# Patient Record
Sex: MALE | Race: WHITE | NOT HISPANIC OR LATINO | Employment: FULL TIME | URBAN - METROPOLITAN AREA
[De-identification: names, ages, dates, MRNs, and addresses within clinical notes are randomized per-mention and may not be internally consistent; named-entity substitution may affect disease eponyms.]

---

## 2023-05-01 ENCOUNTER — OFFICE VISIT (OUTPATIENT)
Dept: PRIMARY CARE | Facility: CLINIC | Age: 33
End: 2023-05-01
Payer: COMMERCIAL

## 2023-05-01 ENCOUNTER — LAB (OUTPATIENT)
Dept: LAB | Facility: LAB | Age: 33
End: 2023-05-01
Payer: COMMERCIAL

## 2023-05-01 VITALS
HEIGHT: 75 IN | HEART RATE: 66 BPM | OXYGEN SATURATION: 97 % | BODY MASS INDEX: 23.5 KG/M2 | WEIGHT: 189 LBS | TEMPERATURE: 96.9 F | DIASTOLIC BLOOD PRESSURE: 78 MMHG | SYSTOLIC BLOOD PRESSURE: 120 MMHG

## 2023-05-01 DIAGNOSIS — Z00.00 HEALTHCARE MAINTENANCE: ICD-10-CM

## 2023-05-01 DIAGNOSIS — Z00.00 ENCOUNTER FOR WELLNESS EXAMINATION IN ADULT: ICD-10-CM

## 2023-05-01 DIAGNOSIS — G43.109 OCULAR MIGRAINE: ICD-10-CM

## 2023-05-01 DIAGNOSIS — J30.2 SEASONAL ALLERGIES: ICD-10-CM

## 2023-05-01 DIAGNOSIS — Z00.00 ENCOUNTER FOR WELLNESS EXAMINATION IN ADULT: Primary | ICD-10-CM

## 2023-05-01 DIAGNOSIS — E55.9 VITAMIN D DEFICIENCY: Primary | ICD-10-CM

## 2023-05-01 DIAGNOSIS — K58.8 OTHER IRRITABLE BOWEL SYNDROME: ICD-10-CM

## 2023-05-01 PROBLEM — E83.52 HYPERCALCEMIA: Status: ACTIVE | Noted: 2023-05-01

## 2023-05-01 PROCEDURE — 82306 VITAMIN D 25 HYDROXY: CPT

## 2023-05-01 PROCEDURE — 99395 PREV VISIT EST AGE 18-39: CPT | Performed by: STUDENT IN AN ORGANIZED HEALTH CARE EDUCATION/TRAINING PROGRAM

## 2023-05-01 PROCEDURE — 85025 COMPLETE CBC W/AUTO DIFF WBC: CPT

## 2023-05-01 PROCEDURE — 84443 ASSAY THYROID STIM HORMONE: CPT

## 2023-05-01 PROCEDURE — 1036F TOBACCO NON-USER: CPT | Performed by: STUDENT IN AN ORGANIZED HEALTH CARE EDUCATION/TRAINING PROGRAM

## 2023-05-01 PROCEDURE — 36415 COLL VENOUS BLD VENIPUNCTURE: CPT

## 2023-05-01 RX ORDER — DICYCLOMINE HYDROCHLORIDE 10 MG/1
10 CAPSULE ORAL 3 TIMES DAILY PRN
Qty: 90 CAPSULE | Refills: 0 | Status: SHIPPED | OUTPATIENT
Start: 2023-05-01 | End: 2024-03-21 | Stop reason: SDUPTHER

## 2023-05-01 RX ORDER — AZELASTINE 1 MG/ML
2 SPRAY, METERED NASAL 2 TIMES DAILY
COMMUNITY
Start: 2020-06-29

## 2023-05-01 RX ORDER — TRIAMCINOLONE ACETONIDE 55 UG/1
SPRAY, METERED NASAL
COMMUNITY
Start: 2022-05-03

## 2023-05-01 RX ORDER — DICYCLOMINE HYDROCHLORIDE 10 MG/1
CAPSULE ORAL
COMMUNITY
Start: 2022-05-03 | End: 2023-05-01 | Stop reason: SDUPTHER

## 2023-05-01 ASSESSMENT — ENCOUNTER SYMPTOMS
DIZZINESS: 0
COUGH: 0
DYSURIA: 0
FREQUENCY: 0
COLOR CHANGE: 0
ARTHRALGIAS: 0
RHINORRHEA: 0
DYSPHORIC MOOD: 0
LIGHT-HEADEDNESS: 0
FATIGUE: 0
CONSTIPATION: 0
ABDOMINAL PAIN: 0
CHILLS: 0
MYALGIAS: 0
NUMBNESS: 0
NERVOUS/ANXIOUS: 0
FEVER: 0
NAUSEA: 0
PALPITATIONS: 0
SORE THROAT: 0
VOMITING: 0
SHORTNESS OF BREATH: 0
DIARRHEA: 0

## 2023-05-01 NOTE — PROGRESS NOTES
"Subjective   Patient ID: Arcenio Rodriguez is a 33 y.o. male who presents for Annual Exam.    HPI     No acute concerns or complaints today.    Dental: Every 6 months  Vision: Has contacts. Due for an appointment.    Diet: Well balanced diet  Exercise: Running, cycling  Caffeine: Coffee in the morning.  Fluids: Enough  Supplements: phytoplankton    Tobacco: None. Never  Alcohol: 3-4 per week.  Recreational Drugs: None    Last Colonoscopy: On father's side, his grandmother and grandfather colon cancer. Grandfather diagnosed in his 50s. Father has been negative with screenings.  Patient had last colonoscopy in 2016. Was done for irritable bowel.    Influenza: Yearly.  Tetanus: 2015  COVID: Up to date.    Review of Systems   Constitutional:  Negative for chills, fatigue and fever.   HENT:  Negative for congestion, rhinorrhea and sore throat.    Eyes:  Negative for visual disturbance.   Respiratory:  Negative for cough and shortness of breath.    Cardiovascular:  Negative for chest pain and palpitations.   Gastrointestinal:  Negative for abdominal pain, constipation, diarrhea, nausea and vomiting.   Genitourinary:  Negative for dysuria and frequency.   Musculoskeletal:  Negative for arthralgias and myalgias.   Skin:  Negative for color change and rash.   Neurological:  Negative for dizziness, light-headedness and numbness.   Psychiatric/Behavioral:  Negative for dysphoric mood. The patient is not nervous/anxious.        Objective   /78   Pulse 66   Temp 36.1 °C (96.9 °F)   Ht 1.905 m (6' 3\")   Wt 85.7 kg (189 lb)   SpO2 97%   BMI 23.62 kg/m²   BSA Body surface area is 2.13 meters squared.      Physical Exam  Vitals and nursing note reviewed.   Constitutional:       General: He is not in acute distress.     Appearance: Normal appearance. He is normal weight. He is not ill-appearing or toxic-appearing.   HENT:      Head: Normocephalic and atraumatic.      Right Ear: Tympanic membrane, ear canal and external ear " normal.      Left Ear: Tympanic membrane, ear canal and external ear normal.      Nose: Nose normal.      Mouth/Throat:      Mouth: Mucous membranes are moist.      Pharynx: Oropharynx is clear.   Eyes:      Extraocular Movements: Extraocular movements intact.      Conjunctiva/sclera: Conjunctivae normal.      Pupils: Pupils are equal, round, and reactive to light.   Cardiovascular:      Rate and Rhythm: Normal rate and regular rhythm.      Pulses: Normal pulses.      Heart sounds: Normal heart sounds.   Pulmonary:      Effort: Pulmonary effort is normal.      Breath sounds: Normal breath sounds.   Abdominal:      General: Abdomen is flat. Bowel sounds are normal.      Palpations: Abdomen is soft.   Musculoskeletal:         General: Normal range of motion.      Cervical back: Normal range of motion and neck supple.   Skin:     General: Skin is warm and dry.   Neurological:      General: No focal deficit present.      Mental Status: He is alert and oriented to person, place, and time. Mental status is at baseline.      Cranial Nerves: No cranial nerve deficit.      Sensory: No sensory deficit.      Motor: No weakness.   Psychiatric:         Mood and Affect: Mood normal.         Behavior: Behavior normal.         Thought Content: Thought content normal.         Judgment: Judgment normal.       Lab on 05/01/2023   Component Date Value Ref Range Status    WBC 05/01/2023 5.8  4.4 - 11.3 x10E9/L Final    nRBC 05/01/2023 0.0  0.0 - 0.0 /100 WBC Final    RBC 05/01/2023 5.11  4.50 - 5.90 x10E12/L Final    Hemoglobin 05/01/2023 14.9  13.5 - 17.5 g/dL Final    Hematocrit 05/01/2023 45.8  41.0 - 52.0 % Final    MCV 05/01/2023 90  80 - 100 fL Final    MCHC 05/01/2023 32.5  32.0 - 36.0 g/dL Final    Platelets 05/01/2023 234  150 - 450 x10E9/L Final    RDW 05/01/2023 12.7  11.5 - 14.5 % Final    Neutrophils % 05/01/2023 54.3  40.0 - 80.0 % Final    Immature Granulocytes %, Automated 05/01/2023 0.0  0.0 - 0.9 % Final     Immature  Granulocyte Count (IG) includes promyelocytes,    myelocytes and metamyelocytes but does not include bands.   Percent differential counts (%) should be interpreted in the   context of the absolute cell counts (cells/L).    Lymphocytes % 05/01/2023 32.1  13.0 - 44.0 % Final    Monocytes % 05/01/2023 8.7  2.0 - 10.0 % Final    Eosinophils % 05/01/2023 3.9  0.0 - 6.0 % Final    Basophils % 05/01/2023 1.0  0.0 - 2.0 % Final    Neutrophils Absolute 05/01/2023 3.16  1.20 - 7.70 x10E9/L Final    Lymphocytes Absolute 05/01/2023 1.87  1.20 - 4.80 x10E9/L Final    Monocytes Absolute 05/01/2023 0.51  0.10 - 1.00 x10E9/L Final    Eosinophils Absolute 05/01/2023 0.23  0.00 - 0.70 x10E9/L Final    Basophils Absolute 05/01/2023 0.06  0.00 - 0.10 x10E9/L Final    TSH 05/01/2023 1.73  0.44 - 3.98 mIU/L Final     TSH testing is performed using different testing    methodology at [facility] than at other    [facility]. Direct result comparisons should    only be made within the same method.    Vitamin D, 25-Hydroxy 05/01/2023 26 (A)  ng/mL Final    .  DEFICIENCY:         < 20   NG/ML  INSUFFICIENCY:      20-29  NG/ML  SUFFICIENCY:         NG/ML    THIS ASSAY ACCURATELY QUANTIFIES THE SUM OF  VITAMIN D3, 25-HYDROXY AND VIT D2,25-HYDROXY.   [facility] Encounter on 05/03/2022   Component Date Value Ref Range Status    Glucose 05/03/2022 76  74 - 99 mg/dL Final    Sodium 05/03/2022 143  136 - 145 mmol/L Final    Potassium 05/03/2022 4.5  3.5 - 5.3 mmol/L Final    Chloride 05/03/2022 105  98 - 107 mmol/L Final    Bicarbonate 05/03/2022 30  21 - 32 mmol/L Final    Anion Gap 05/03/2022 13  10 - 20 mmol/L Final    Urea Nitrogen 05/03/2022 27 (H)  6 - 23 mg/dL Final    Creatinine 05/03/2022 1.04  0.50 - 1.30 mg/dL Final    GFR MALE 05/03/2022 >90  >90 mL/min/1.73m2 Final    Comment:  CALCULATIONS OF ESTIMATED GFR ARE PERFORMED   USING THE 2021 CKD-EPI STUDY REFIT EQUATION   WITHOUT THE RACE VARIABLE FOR THE  IDMS-TRACEABLE   CREATININE METHODS.    https://jasn.asnjournals.org/content/early/2021/09/22/ASN.9949035408      Calcium 05/03/2022 10.7 (H)  8.6 - 10.6 mg/dL Final    Albumin 05/03/2022 5.3 (H)  3.4 - 5.0 g/dL Final    Alkaline Phosphatase 05/03/2022 57  33 - 120 U/L Final    Total Protein 05/03/2022 7.4  6.4 - 8.2 g/dL Final    AST 05/03/2022 22  9 - 39 U/L Final    Total Bilirubin 05/03/2022 1.1  0.0 - 1.2 mg/dL Final    ALT (SGPT) 05/03/2022 18  10 - 52 U/L Final    Comment:  Patients treated with Sulfasalazine may generate    falsely decreased results for ALT.      TSH 05/03/2022 2.62  0.44 - 3.98 mIU/L Final    Comment:  TSH testing is performed using different testing    methodology at [facility] than at other    [facility]. Direct result comparisons should    only be made within the same method.      Cholesterol 05/03/2022 153  0 - 199 mg/dL Final    Comment: .      AGE      DESIRABLE   BORDERLINE HIGH   HIGH     0-19 Y     0 - 169       170 - 199     >/= 200    20-24 Y     0 - 189       190 - 224     >/= 225         >24 Y     0 - 199       200 - 239     >/= 240   **All ranges are based on fasting samples. Specific   therapeutic targets will vary based on patient-specific   cardiac risk.  .   Pediatric guidelines reference:Pediatrics 2011, 128(S5).   Adult guidelines reference: NCEP ATPIII Guidelines,     JULIA 2001, 258:2486-97  .   Venipuncture immediately after or during the    administration of Metamizole may lead to falsely   low results. Testing should be performed immediately   prior to Metamizole dosing.      HDL 05/03/2022 61.5  mg/dL Final    Comment: .      AGE      VERY LOW   LOW     NORMAL    HIGH       0-19 Y       < 35   < 40     40-45     ----    20-24 Y       ----   < 40       >45     ----      >24 Y       ----   < 40     40-60      >60  .      Cholesterol/HDL Ratio 05/03/2022 2.5   Final    Comment: REF VALUES  DESIRABLE  < 3.4  HIGH RISK  > 5.0      LDL 05/03/2022 78  0  - 99 mg/dL Final    Comment: .                           NEAR      BORD      AGE      DESIRABLE  OPTIMAL    HIGH     HIGH     VERY HIGH     0-19 Y     0 - 109     ---    110-129   >/= 130     ----    20-24 Y     0 - 119     ---    120-159   >/= 160     ----      >24 Y     0 -  99   100-129  130-159   160-189     >/=190  .      VLDL 05/03/2022 13  0 - 40 mg/dL Final    Triglycerides 05/03/2022 66  0 - 149 mg/dL Final    Comment: .      AGE      DESIRABLE   BORDERLINE HIGH   HIGH     VERY HIGH   0 D-90 D    19 - 174         ----         ----        ----  91 D- 9 Y     0 -  74        75 -  99     >/= 100      ----    10-19 Y     0 -  89        90 - 129     >/= 130      ----    20-24 Y     0 - 114       115 - 149     >/= 150      ----         >24 Y     0 - 149       150 - 199    200- 499    >/= 500  .   Venipuncture immediately after or during the    administration of Metamizole may lead to falsely   low results. Testing should be performed immediately   prior to Metamizole dosing.      WBC 05/03/2022 6.0  4.4 - 11.3 x10E9/L Final    nRBC 05/03/2022 0.0  0.0 - 0.0 /100 WBC Final    RBC 05/03/2022 4.94  4.50 - 5.90 x10E12/L Final    Hemoglobin 05/03/2022 14.9  13.5 - 17.5 g/dL Final    Hematocrit 05/03/2022 45.2  41.0 - 52.0 % Final    MCV 05/03/2022 91  80 - 100 fL Final    MCHC 05/03/2022 33.0  32.0 - 36.0 g/dL Final    Platelets 05/03/2022 209  150 - 450 x10E9/L Final    RDW 05/03/2022 13.0  11.5 - 14.5 % Final    Neutrophils % 05/03/2022 56.4  40.0 - 80.0 % Final    Immature Granulocytes %, Automated 05/03/2022 0.0  0.0 - 0.9 % Final    Comment:  Immature Granulocyte Count (IG) includes promyelocytes,    myelocytes and metamyelocytes but does not include bands.   Percent differential counts (%) should be interpreted in the   context of the absolute cell counts (cells/L).      Lymphocytes % 05/03/2022 30.5  13.0 - 44.0 % Final    Monocytes % 05/03/2022 8.7  2.0 - 10.0 % Final    Eosinophils % 05/03/2022 3.7  0.0 - 6.0  % Final    Basophils % 05/03/2022 0.7  0.0 - 2.0 % Final    Neutrophils Absolute 05/03/2022 3.37  1.20 - 7.70 x10E9/L Final    Lymphocytes Absolute 05/03/2022 1.82  1.20 - 4.80 x10E9/L Final    Monocytes Absolute 05/03/2022 0.52  0.10 - 1.00 x10E9/L Final    Eosinophils Absolute 05/03/2022 0.22  0.00 - 0.70 x10E9/L Final    Basophils Absolute 05/03/2022 0.04  0.00 - 0.10 x10E9/L Final    T-SPOT. TB Interpretation 05/03/2022 Negative  Normal Value: Negative Final    Comment: A negative test result does not exclude the possibility of exposure to   or infection with Mycobacterium tuberculosis (M. tuberculosis).    Patients with recent exposure to TB infected individuals exhibiting a   negative T-SPOT.TB result should be considered for retesting within 6   weeks or if other relevant clinical symptoms indicate.  Results from   T-SPOT.TB testing must be used in conjunction with each individual's   epidemiological history, current medical status, and results of other   diagnostic evaluations.  The T-SPOT.TB test is qualitative and results   are reported as positive, borderline or negative, given that the test   controls perform as expected. In line with the Centers for Disease   Control and Prevention's 2010 recommendation to report quantitative   measurements alongside the qualitative result, the laboratory provides   spot counts for informational purposes only.  The T-SPOT.TB test should   not be interpreted as a quantitative test.      Panel A Spot Count 05/03/2022 0   Final    Panel B Spot Count 05/03/2022 0   Final    NIL(NEG) Control Spot Count 05/03/2022 Passed   Final    POS Control Spot Count 05/03/2022 Passed   Final     Current Outpatient Medications on File Prior to Visit   Medication Sig Dispense Refill    azelastine (Astelin) 137 mcg (0.1 %) nasal spray Administer 2 sprays into each nostril 2 times a day.      triamcinolone (Nasacort) 55 mcg nasal inhaler Administer into affected nostril(s).       No current  facility-administered medications on file prior to visit.     No images are attached to the encounter.        Assessment/Plan   Problem List Items Addressed This Visit          Digestive    Other irritable bowel syndrome     Refilled patient's Bentyl.         Relevant Medications    dicyclomine (Bentyl) 10 mg capsule       Other    Seasonal allergies    Encounter for wellness examination in adult - Primary     Patient overall doing well without any acute concerns or complaints today.  Reviewed results of recent lab work.  Ordered a couple other screening tests including for vitamin D and rechecking thyroid function as well as blood counts.  No additional screening test needed at this time.  We will follow-up on results.  Patient to call back sooner for acute concerns or complaints.         Relevant Orders    CBC and Auto Differential (Completed)    TSH with reflex to Free T4 if abnormal (Completed)    Vitamin D, Total (Completed)    Ocular migraine     Other Visit Diagnoses       Healthcare maintenance        Relevant Orders    CBC and Auto Differential (Completed)    TSH with reflex to Free T4 if abnormal (Completed)    Vitamin D, Total (Completed)

## 2023-05-02 LAB
BASOPHILS (10*3/UL) IN BLOOD BY AUTOMATED COUNT: 0.06 X10E9/L (ref 0–0.1)
BASOPHILS/100 LEUKOCYTES IN BLOOD BY AUTOMATED COUNT: 1 % (ref 0–2)
CALCIDIOL (25 OH VITAMIN D3) (NG/ML) IN SER/PLAS: 26 NG/ML
EOSINOPHILS (10*3/UL) IN BLOOD BY AUTOMATED COUNT: 0.23 X10E9/L (ref 0–0.7)
EOSINOPHILS/100 LEUKOCYTES IN BLOOD BY AUTOMATED COUNT: 3.9 % (ref 0–6)
ERYTHROCYTE DISTRIBUTION WIDTH (RATIO) BY AUTOMATED COUNT: 12.7 % (ref 11.5–14.5)
ERYTHROCYTE MEAN CORPUSCULAR HEMOGLOBIN CONCENTRATION (G/DL) BY AUTOMATED: 32.5 G/DL (ref 32–36)
ERYTHROCYTE MEAN CORPUSCULAR VOLUME (FL) BY AUTOMATED COUNT: 90 FL (ref 80–100)
ERYTHROCYTES (10*6/UL) IN BLOOD BY AUTOMATED COUNT: 5.11 X10E12/L (ref 4.5–5.9)
HEMATOCRIT (%) IN BLOOD BY AUTOMATED COUNT: 45.8 % (ref 41–52)
HEMOGLOBIN (G/DL) IN BLOOD: 14.9 G/DL (ref 13.5–17.5)
IMMATURE GRANULOCYTES/100 LEUKOCYTES IN BLOOD BY AUTOMATED COUNT: 0 % (ref 0–0.9)
LEUKOCYTES (10*3/UL) IN BLOOD BY AUTOMATED COUNT: 5.8 X10E9/L (ref 4.4–11.3)
LYMPHOCYTES (10*3/UL) IN BLOOD BY AUTOMATED COUNT: 1.87 X10E9/L (ref 1.2–4.8)
LYMPHOCYTES/100 LEUKOCYTES IN BLOOD BY AUTOMATED COUNT: 32.1 % (ref 13–44)
MONOCYTES (10*3/UL) IN BLOOD BY AUTOMATED COUNT: 0.51 X10E9/L (ref 0.1–1)
MONOCYTES/100 LEUKOCYTES IN BLOOD BY AUTOMATED COUNT: 8.7 % (ref 2–10)
NEUTROPHILS (10*3/UL) IN BLOOD BY AUTOMATED COUNT: 3.16 X10E9/L (ref 1.2–7.7)
NEUTROPHILS/100 LEUKOCYTES IN BLOOD BY AUTOMATED COUNT: 54.3 % (ref 40–80)
NRBC (PER 100 WBCS) BY AUTOMATED COUNT: 0 /100 WBC (ref 0–0)
PLATELETS (10*3/UL) IN BLOOD AUTOMATED COUNT: 234 X10E9/L (ref 150–450)
THYROTROPIN (MIU/L) IN SER/PLAS BY DETECTION LIMIT <= 0.05 MIU/L: 1.73 MIU/L (ref 0.44–3.98)

## 2023-05-14 PROBLEM — G43.109 OCULAR MIGRAINE: Status: ACTIVE | Noted: 2023-05-14

## 2023-05-14 PROBLEM — Z00.00 ENCOUNTER FOR WELLNESS EXAMINATION IN ADULT: Status: ACTIVE | Noted: 2023-05-14

## 2023-05-14 PROBLEM — K58.8 OTHER IRRITABLE BOWEL SYNDROME: Status: ACTIVE | Noted: 2023-05-14

## 2023-05-15 NOTE — ASSESSMENT & PLAN NOTE
Patient overall doing well without any acute concerns or complaints today.  Reviewed results of recent lab work.  Ordered a couple other screening tests including for vitamin D and rechecking thyroid function as well as blood counts.  No additional screening test needed at this time.  We will follow-up on results.  Patient to call back sooner for acute concerns or complaints.

## 2023-05-19 ENCOUNTER — OFFICE VISIT (OUTPATIENT)
Dept: PRIMARY CARE | Facility: CLINIC | Age: 33
End: 2023-05-19
Payer: COMMERCIAL

## 2023-05-19 VITALS
WEIGHT: 191.38 LBS | DIASTOLIC BLOOD PRESSURE: 71 MMHG | BODY MASS INDEX: 23.92 KG/M2 | HEART RATE: 66 BPM | TEMPERATURE: 97.9 F | SYSTOLIC BLOOD PRESSURE: 116 MMHG | OXYGEN SATURATION: 97 %

## 2023-05-19 DIAGNOSIS — S05.01XA ABRASION OF RIGHT CORNEA, INITIAL ENCOUNTER: ICD-10-CM

## 2023-05-19 DIAGNOSIS — J02.8 ACUTE PHARYNGITIS DUE TO OTHER SPECIFIED ORGANISMS: Primary | ICD-10-CM

## 2023-05-19 DIAGNOSIS — J02.9 SORE THROAT: ICD-10-CM

## 2023-05-19 LAB — POC RAPID STREP: NEGATIVE

## 2023-05-19 PROCEDURE — 99213 OFFICE O/P EST LOW 20 MIN: CPT | Performed by: NURSE PRACTITIONER

## 2023-05-19 PROCEDURE — 87880 STREP A ASSAY W/OPTIC: CPT | Performed by: NURSE PRACTITIONER

## 2023-05-19 PROCEDURE — 1036F TOBACCO NON-USER: CPT | Performed by: NURSE PRACTITIONER

## 2023-05-19 RX ORDER — ACETAMINOPHEN 500 MG
TABLET ORAL DAILY
COMMUNITY

## 2023-05-19 RX ORDER — AMOXICILLIN AND CLAVULANATE POTASSIUM 875; 125 MG/1; MG/1
875 TABLET, FILM COATED ORAL 2 TIMES DAILY
Qty: 20 TABLET | Refills: 0 | Status: SHIPPED | OUTPATIENT
Start: 2023-05-19 | End: 2023-05-29

## 2023-05-19 RX ORDER — POLYMYXIN B SULFATE AND TRIMETHOPRIM 1; 10000 MG/ML; [USP'U]/ML
1 SOLUTION OPHTHALMIC EVERY 4 HOURS
Qty: 1 ML | Refills: 0 | Status: SHIPPED | OUTPATIENT
Start: 2023-05-19 | End: 2023-05-22

## 2023-05-19 ASSESSMENT — ENCOUNTER SYMPTOMS
DIARRHEA: 0
SINUS PRESSURE: 1
RHINORRHEA: 1
ABDOMINAL PAIN: 0
NAUSEA: 0
SINUS PAIN: 1
WHEEZING: 0
SORE THROAT: 1
FEVER: 0
PALPITATIONS: 0
CHILLS: 0
VOMITING: 0
FATIGUE: 0
SHORTNESS OF BREATH: 0
COUGH: 0

## 2023-05-19 NOTE — PROGRESS NOTES
Subjective   Chief Complaint: Sore Throat (2 weeks).    HPI   Arcenio Rodriguez is a 33 y.o. male who presents for Sore Throat (2 weeks).  Patient reports sore throat for 2 weeks. He does report a history of seasonal allergies and has been working outside a lot. He is concerned with the persistent sore throat. He reports it is waking him up at night and notices swelling in back of throat.     He does report nasal congestion has also been worse, sinus pressure, left ear pain/pressure.     Patient denies fever, chills, nausea, vomiting, diarrhea, chest pain, heart palpations, or shortness of breath.     Has tried flonase in the past with no relief.     Strep is negative.     Azelastine and nasacort has been using with no relief.      Review of Systems   Constitutional:  Negative for chills, fatigue and fever.   HENT:  Positive for congestion, ear pain, postnasal drip, rhinorrhea, sinus pressure, sinus pain, sneezing and sore throat. Negative for ear discharge and nosebleeds.    Respiratory:  Negative for cough, shortness of breath and wheezing.    Cardiovascular:  Negative for chest pain and palpitations.   Gastrointestinal:  Negative for abdominal pain, diarrhea, nausea and vomiting.       Objective   /71   Pulse 66   Temp 36.6 °C (97.9 °F) (Temporal)   Wt 86.8 kg (191 lb 6 oz)   SpO2 97%   BMI 23.92 kg/m²   BSA Body surface area is 2.14 meters squared.      Physical Exam  Constitutional:       Appearance: Normal appearance.   HENT:      Right Ear: Tympanic membrane normal.      Left Ear: Tympanic membrane normal.      Mouth/Throat:      Mouth: Mucous membranes are moist.      Pharynx: Posterior oropharyngeal erythema present. No oropharyngeal exudate.   Eyes:      Extraocular Movements: Extraocular movements intact.      Pupils: Pupils are equal, round, and reactive to light.   Cardiovascular:      Rate and Rhythm: Normal rate and regular rhythm.   Pulmonary:      Effort: Pulmonary effort is normal.       Breath sounds: Normal breath sounds.   Abdominal:      General: Abdomen is flat.      Palpations: Abdomen is soft.   Neurological:      Mental Status: He is alert.       Lab on 05/01/2023   Component Date Value Ref Range Status    WBC 05/01/2023 5.8  4.4 - 11.3 x10E9/L Final    nRBC 05/01/2023 0.0  0.0 - 0.0 /100 WBC Final    RBC 05/01/2023 5.11  4.50 - 5.90 x10E12/L Final    Hemoglobin 05/01/2023 14.9  13.5 - 17.5 g/dL Final    Hematocrit 05/01/2023 45.8  41.0 - 52.0 % Final    MCV 05/01/2023 90  80 - 100 fL Final    MCHC 05/01/2023 32.5  32.0 - 36.0 g/dL Final    Platelets 05/01/2023 234  150 - 450 x10E9/L Final    RDW 05/01/2023 12.7  11.5 - 14.5 % Final    Neutrophils % 05/01/2023 54.3  40.0 - 80.0 % Final    Immature Granulocytes %, Automated 05/01/2023 0.0  0.0 - 0.9 % Final     Immature Granulocyte Count (IG) includes promyelocytes,    myelocytes and metamyelocytes but does not include bands.   Percent differential counts (%) should be interpreted in the   context of the absolute cell counts (cells/L).    Lymphocytes % 05/01/2023 32.1  13.0 - 44.0 % Final    Monocytes % 05/01/2023 8.7  2.0 - 10.0 % Final    Eosinophils % 05/01/2023 3.9  0.0 - 6.0 % Final    Basophils % 05/01/2023 1.0  0.0 - 2.0 % Final    Neutrophils Absolute 05/01/2023 3.16  1.20 - 7.70 x10E9/L Final    Lymphocytes Absolute 05/01/2023 1.87  1.20 - 4.80 x10E9/L Final    Monocytes Absolute 05/01/2023 0.51  0.10 - 1.00 x10E9/L Final    Eosinophils Absolute 05/01/2023 0.23  0.00 - 0.70 x10E9/L Final    Basophils Absolute 05/01/2023 0.06  0.00 - 0.10 x10E9/L Final    TSH 05/01/2023 1.73  0.44 - 3.98 mIU/L Final     TSH testing is performed using different testing    methodology at [facility] than at other    [facility]. Direct result comparisons should    only be made within the same method.    Vitamin D, 25-Hydroxy 05/01/2023 26 (A)  ng/mL Final    .  DEFICIENCY:         < 20   NG/ML  INSUFFICIENCY:      20-29   NG/ML  SUFFICIENCY:         NG/ML    THIS ASSAY ACCURATELY QUANTIFIES THE SUM OF  VITAMIN D3, 25-HYDROXY AND VIT D2,25-HYDROXY.     Current Outpatient Medications on File Prior to Visit   Medication Sig Dispense Refill    azelastine (Astelin) 137 mcg (0.1 %) nasal spray Administer 2 sprays into each nostril 2 times a day.      cholecalciferol (Vitamin D3) 5,000 Units tablet Take by mouth once daily.      dicyclomine (Bentyl) 10 mg capsule Take 1 capsule (10 mg) by mouth 3 times a day as needed (abdominal cramping). 90 capsule 0    olopatadine HCl (PATADAY OPHT) Administer into affected eye(s).      triamcinolone (Nasacort) 55 mcg nasal inhaler Administer into affected nostril(s).       No current facility-administered medications on file prior to visit.     No images are attached to the encounter.            Assessment/Plan   Problem List Items Addressed This Visit          Infectious/Inflammatory    Acute pharyngitis due to other specified organisms - Primary     Antibiotic sent to pharmacy  Advised patient to push fluids  Advised patient to call if symptoms worsen or do not improve  IO strep neg          Relevant Medications    amoxicillin-pot clavulanate (Augmentin) 875-125 mg tablet       Other    Right corneal abrasion     Antibiotic sent to pharmacy          Relevant Medications    polymyxin B sulf-trimethoprim (Polytrim) ophthalmic solution     Other Visit Diagnoses       Sore throat        Relevant Orders    POCT Rapid Strep A manually resulted (Completed)

## 2023-05-19 NOTE — PATIENT INSTRUCTIONS
Start taking antibiotic as prescribed  Start using eye drops as prescribed  Call if your symptoms worsen or do not improve

## 2023-05-19 NOTE — ASSESSMENT & PLAN NOTE
Antibiotic sent to pharmacy  Advised patient to push fluids  Advised patient to call if symptoms worsen or do not improve  IO strep neg

## 2024-03-21 ENCOUNTER — OFFICE VISIT (OUTPATIENT)
Dept: PRIMARY CARE | Facility: CLINIC | Age: 34
End: 2024-03-21
Payer: COMMERCIAL

## 2024-03-21 VITALS
DIASTOLIC BLOOD PRESSURE: 70 MMHG | TEMPERATURE: 97.3 F | SYSTOLIC BLOOD PRESSURE: 122 MMHG | OXYGEN SATURATION: 97 % | WEIGHT: 188 LBS | HEART RATE: 78 BPM | HEIGHT: 75 IN | BODY MASS INDEX: 23.38 KG/M2

## 2024-03-21 DIAGNOSIS — K58.8 OTHER IRRITABLE BOWEL SYNDROME: ICD-10-CM

## 2024-03-21 DIAGNOSIS — K64.9 ACUTE HEMORRHOID: Primary | ICD-10-CM

## 2024-03-21 PROCEDURE — 1036F TOBACCO NON-USER: CPT | Performed by: FAMILY MEDICINE

## 2024-03-21 PROCEDURE — 99213 OFFICE O/P EST LOW 20 MIN: CPT | Performed by: FAMILY MEDICINE

## 2024-03-21 RX ORDER — BISMUTH SUBSALICYLATE 262 MG
1 TABLET,CHEWABLE ORAL DAILY
COMMUNITY

## 2024-03-21 RX ORDER — DICYCLOMINE HYDROCHLORIDE 10 MG/1
10 CAPSULE ORAL 3 TIMES DAILY PRN
Qty: 90 CAPSULE | Refills: 0 | Status: SHIPPED | OUTPATIENT
Start: 2024-03-21

## 2024-03-21 RX ORDER — HYDROCORTISONE 25 MG/G
CREAM TOPICAL 4 TIMES DAILY PRN
Qty: 30 G | Refills: 5 | Status: SHIPPED | OUTPATIENT
Start: 2024-03-21 | End: 2025-03-21

## 2024-03-21 ASSESSMENT — PATIENT HEALTH QUESTIONNAIRE - PHQ9
SUM OF ALL RESPONSES TO PHQ9 QUESTIONS 1 AND 2: 0
2. FEELING DOWN, DEPRESSED OR HOPELESS: NOT AT ALL
1. LITTLE INTEREST OR PLEASURE IN DOING THINGS: NOT AT ALL
10. IF YOU CHECKED OFF ANY PROBLEMS, HOW DIFFICULT HAVE THESE PROBLEMS MADE IT FOR YOU TO DO YOUR WORK, TAKE CARE OF THINGS AT HOME, OR GET ALONG WITH OTHER PEOPLE: NOT DIFFICULT AT ALL

## 2024-03-21 ASSESSMENT — ENCOUNTER SYMPTOMS
ROS GI COMMENTS: RECTAL PAIN
DEPRESSION: 0
OCCASIONAL FEELINGS OF UNSTEADINESS: 0
ABDOMINAL PAIN: 0
BACK PAIN: 0
RESPIRATORY NEGATIVE: 1
EYES NEGATIVE: 1
CARDIOVASCULAR NEGATIVE: 1
LOSS OF SENSATION IN FEET: 0
CONSTIPATION: 1

## 2024-03-21 NOTE — PATIENT INSTRUCTIONS
Would like you to use ibuprofen 600 mg 3 times a day.    Please use hemorrhoidal cream twice daily.    Please use sitz bath's.    If this is not improved or worsening symptoms, please call and let me know.

## 2024-03-21 NOTE — PROGRESS NOTES
"Subjective   Patient ID: Arcenio Rodriguez is a 34 y.o. male who presents for Rectal Pain (Bleeding and discomfort).    Taking prep H.  Patient presents with rectal pain.    Patient was traveling and ended up coming home he was a bit constipated had a formed bowel movement and then afterward had rectal irritation.  Had a bit of blood on the stool pain in the rectal area as well.  He has had no fever no chills or other change.  No troubles with abdominal pain or discomfort.    Recent constipaion.         patient presents with rectal pain bleeding and discomfort.    Review of Systems   Eyes: Negative.    Respiratory: Negative.     Cardiovascular: Negative.    Gastrointestinal:  Positive for constipation. Negative for abdominal pain.        Rectal pain   Musculoskeletal:  Negative for back pain.       Objective   /70   Pulse 78   Temp 36.3 °C (97.3 °F)   Ht 1.905 m (6' 3\")   Wt 85.3 kg (188 lb)   SpO2 97%   BMI 23.50 kg/m²   BSA Body surface area is 2.12 meters squared.      Physical Exam  Constitutional:       Appearance: Normal appearance.   HENT:      Mouth/Throat:      Mouth: Mucous membranes are dry.   Cardiovascular:      Rate and Rhythm: Normal rate and regular rhythm.      Pulses: Normal pulses.      Heart sounds: Normal heart sounds.   Pulmonary:      Effort: Pulmonary effort is normal.   Abdominal:      General: There is no distension.   Genitourinary:     Comments: External hemorrhoid.  Very painful for patient.  Unable to do a good rectal exam because of the discomfort.      Office Visit on 05/19/2023   Component Date Value Ref Range Status    POC Rapid Strep 05/19/2023 Negative  Negative Final   Lab on 05/01/2023   Component Date Value Ref Range Status    WBC 05/01/2023 5.8  4.4 - 11.3 x10E9/L Final    nRBC 05/01/2023 0.0  0.0 - 0.0 /100 WBC Final    RBC 05/01/2023 5.11  4.50 - 5.90 x10E12/L Final    Hemoglobin 05/01/2023 14.9  13.5 - 17.5 g/dL Final    Hematocrit 05/01/2023 45.8  41.0 - 52.0 % " Final    MCV 05/01/2023 90  80 - 100 fL Final    MCHC 05/01/2023 32.5  32.0 - 36.0 g/dL Final    Platelets 05/01/2023 234  150 - 450 x10E9/L Final    RDW 05/01/2023 12.7  11.5 - 14.5 % Final    Neutrophils % 05/01/2023 54.3  40.0 - 80.0 % Final    Immature Granulocytes %, Automated 05/01/2023 0.0  0.0 - 0.9 % Final     Immature Granulocyte Count (IG) includes promyelocytes,    myelocytes and metamyelocytes but does not include bands.   Percent differential counts (%) should be interpreted in the   context of the absolute cell counts (cells/L).    Lymphocytes % 05/01/2023 32.1  13.0 - 44.0 % Final    Monocytes % 05/01/2023 8.7  2.0 - 10.0 % Final    Eosinophils % 05/01/2023 3.9  0.0 - 6.0 % Final    Basophils % 05/01/2023 1.0  0.0 - 2.0 % Final    Neutrophils Absolute 05/01/2023 3.16  1.20 - 7.70 x10E9/L Final    Lymphocytes Absolute 05/01/2023 1.87  1.20 - 4.80 x10E9/L Final    Monocytes Absolute 05/01/2023 0.51  0.10 - 1.00 x10E9/L Final    Eosinophils Absolute 05/01/2023 0.23  0.00 - 0.70 x10E9/L Final    Basophils Absolute 05/01/2023 0.06  0.00 - 0.10 x10E9/L Final    TSH 05/01/2023 1.73  0.44 - 3.98 mIU/L Final     TSH testing is performed using different testing    methodology at [facility] than at other    [facility] hospitals. Direct result comparisons should    only be made within the same method.    Vitamin D, 25-Hydroxy 05/01/2023 26 (A)  ng/mL Final    .  DEFICIENCY:         < 20   NG/ML  INSUFFICIENCY:      20-29  NG/ML  SUFFICIENCY:         NG/ML    THIS ASSAY ACCURATELY QUANTIFIES THE SUM OF  VITAMIN D3, 25-HYDROXY AND VIT D2,25-HYDROXY.     Current Outpatient Medications on File Prior to Visit   Medication Sig Dispense Refill    azelastine (Astelin) 137 mcg (0.1 %) nasal spray Administer 2 sprays into each nostril 2 times a day.      cholecalciferol (Vitamin D3) 5,000 Units tablet Take by mouth once daily.      dicyclomine (Bentyl) 10 mg capsule Take 1 capsule (10 mg) by mouth 3 times  a day as needed (abdominal cramping). 90 capsule 0    multivitamin tablet Take 1 tablet by mouth once daily.      olopatadine HCl (PATADAY OPHT) Administer into affected eye(s).      triamcinolone (Nasacort) 55 mcg nasal inhaler Administer into affected nostril(s).       No current facility-administered medications on file prior to visit.     No images are attached to the encounter.            Assessment/Plan   Problem List Items Addressed This Visit             ICD-10-CM    Other irritable bowel syndrome K58.8    Relevant Medications    dicyclomine (Bentyl) 10 mg capsule    Acute hemorrhoid - Primary K64.9    Relevant Medications    hydrocortisone (Anusol-HC) 2.5 % rectal cream

## 2024-03-29 ENCOUNTER — APPOINTMENT (OUTPATIENT)
Dept: PRIMARY CARE | Facility: CLINIC | Age: 34
End: 2024-03-29
Payer: COMMERCIAL

## 2024-07-09 ENCOUNTER — APPOINTMENT (OUTPATIENT)
Dept: PRIMARY CARE | Facility: CLINIC | Age: 34
End: 2024-07-09
Payer: COMMERCIAL

## 2024-07-09 ENCOUNTER — LAB (OUTPATIENT)
Dept: LAB | Facility: LAB | Age: 34
End: 2024-07-09
Payer: COMMERCIAL

## 2024-07-09 VITALS
OXYGEN SATURATION: 98 % | WEIGHT: 188 LBS | SYSTOLIC BLOOD PRESSURE: 132 MMHG | BODY MASS INDEX: 23.5 KG/M2 | DIASTOLIC BLOOD PRESSURE: 76 MMHG | HEART RATE: 57 BPM | TEMPERATURE: 97.5 F

## 2024-07-09 DIAGNOSIS — E55.9 VITAMIN D DEFICIENCY: ICD-10-CM

## 2024-07-09 DIAGNOSIS — G43.109 OCULAR MIGRAINE: ICD-10-CM

## 2024-07-09 DIAGNOSIS — K58.8 OTHER IRRITABLE BOWEL SYNDROME: ICD-10-CM

## 2024-07-09 DIAGNOSIS — R25.1 EPISODE OF SHAKING: ICD-10-CM

## 2024-07-09 DIAGNOSIS — Z00.00 HEALTHCARE MAINTENANCE: ICD-10-CM

## 2024-07-09 DIAGNOSIS — R25.1 EPISODE OF SHAKING: Primary | ICD-10-CM

## 2024-07-09 LAB
25(OH)D3 SERPL-MCNC: 46 NG/ML (ref 30–100)
ALBUMIN SERPL BCP-MCNC: 5 G/DL (ref 3.4–5)
ALP SERPL-CCNC: 59 U/L (ref 33–120)
ALT SERPL W P-5'-P-CCNC: 34 U/L (ref 10–52)
ANION GAP SERPL CALC-SCNC: 15 MMOL/L (ref 10–20)
AST SERPL W P-5'-P-CCNC: 24 U/L (ref 9–39)
BASOPHILS # BLD AUTO: 0.06 X10*3/UL (ref 0–0.1)
BASOPHILS NFR BLD AUTO: 1 %
BILIRUB SERPL-MCNC: 1 MG/DL (ref 0–1.2)
BUN SERPL-MCNC: 25 MG/DL (ref 6–23)
CALCIUM SERPL-MCNC: 10 MG/DL (ref 8.6–10.6)
CHLORIDE SERPL-SCNC: 103 MMOL/L (ref 98–107)
CHOLEST SERPL-MCNC: 149 MG/DL (ref 0–199)
CHOLESTEROL/HDL RATIO: 2.4
CO2 SERPL-SCNC: 26 MMOL/L (ref 21–32)
CREAT SERPL-MCNC: 1.11 MG/DL (ref 0.5–1.3)
EGFRCR SERPLBLD CKD-EPI 2021: 89 ML/MIN/1.73M*2
EOSINOPHIL # BLD AUTO: 0.18 X10*3/UL (ref 0–0.7)
EOSINOPHIL NFR BLD AUTO: 3.1 %
ERYTHROCYTE [DISTWIDTH] IN BLOOD BY AUTOMATED COUNT: 12.3 % (ref 11.5–14.5)
EST. AVERAGE GLUCOSE BLD GHB EST-MCNC: 97 MG/DL
FOLATE SERPL-MCNC: >24 NG/ML
GLUCOSE SERPL-MCNC: 81 MG/DL (ref 74–99)
HBA1C MFR BLD: 5 %
HCT VFR BLD AUTO: 41.5 % (ref 41–52)
HDLC SERPL-MCNC: 63.2 MG/DL
HGB BLD-MCNC: 14.1 G/DL (ref 13.5–17.5)
IMM GRANULOCYTES # BLD AUTO: 0.01 X10*3/UL (ref 0–0.7)
IMM GRANULOCYTES NFR BLD AUTO: 0.2 % (ref 0–0.9)
LDLC SERPL CALC-MCNC: 76 MG/DL
LYMPHOCYTES # BLD AUTO: 2.15 X10*3/UL (ref 1.2–4.8)
LYMPHOCYTES NFR BLD AUTO: 37.2 %
MAGNESIUM SERPL-MCNC: 2.21 MG/DL (ref 1.6–2.4)
MCH RBC QN AUTO: 29.1 PG (ref 26–34)
MCHC RBC AUTO-ENTMCNC: 34 G/DL (ref 32–36)
MCV RBC AUTO: 86 FL (ref 80–100)
MONOCYTES # BLD AUTO: 0.48 X10*3/UL (ref 0.1–1)
MONOCYTES NFR BLD AUTO: 8.3 %
NEUTROPHILS # BLD AUTO: 2.9 X10*3/UL (ref 1.2–7.7)
NEUTROPHILS NFR BLD AUTO: 50.2 %
NON HDL CHOLESTEROL: 86 MG/DL (ref 0–149)
NRBC BLD-RTO: 0 /100 WBCS (ref 0–0)
PLATELET # BLD AUTO: 245 X10*3/UL (ref 150–450)
POTASSIUM SERPL-SCNC: 4.3 MMOL/L (ref 3.5–5.3)
PROT SERPL-MCNC: 7 G/DL (ref 6.4–8.2)
RBC # BLD AUTO: 4.85 X10*6/UL (ref 4.5–5.9)
SODIUM SERPL-SCNC: 140 MMOL/L (ref 136–145)
T4 FREE SERPL-MCNC: 1.24 NG/DL (ref 0.78–1.48)
TRIGL SERPL-MCNC: 51 MG/DL (ref 0–149)
TSH SERPL-ACNC: 1.92 MIU/L (ref 0.44–3.98)
VIT B12 SERPL-MCNC: 766 PG/ML (ref 211–911)
VLDL: 10 MG/DL (ref 0–40)
WBC # BLD AUTO: 5.8 X10*3/UL (ref 4.4–11.3)

## 2024-07-09 PROCEDURE — 82607 VITAMIN B-12: CPT

## 2024-07-09 PROCEDURE — 80061 LIPID PANEL: CPT

## 2024-07-09 PROCEDURE — 99213 OFFICE O/P EST LOW 20 MIN: CPT | Performed by: STUDENT IN AN ORGANIZED HEALTH CARE EDUCATION/TRAINING PROGRAM

## 2024-07-09 PROCEDURE — 84439 ASSAY OF FREE THYROXINE: CPT

## 2024-07-09 PROCEDURE — 36415 COLL VENOUS BLD VENIPUNCTURE: CPT

## 2024-07-09 PROCEDURE — 82746 ASSAY OF FOLIC ACID SERUM: CPT

## 2024-07-09 PROCEDURE — 84443 ASSAY THYROID STIM HORMONE: CPT

## 2024-07-09 PROCEDURE — 80053 COMPREHEN METABOLIC PANEL: CPT

## 2024-07-09 PROCEDURE — 85025 COMPLETE CBC W/AUTO DIFF WBC: CPT

## 2024-07-09 PROCEDURE — 82306 VITAMIN D 25 HYDROXY: CPT

## 2024-07-09 PROCEDURE — 83735 ASSAY OF MAGNESIUM: CPT

## 2024-07-09 PROCEDURE — 83036 HEMOGLOBIN GLYCOSYLATED A1C: CPT

## 2024-07-09 ASSESSMENT — ENCOUNTER SYMPTOMS
ABDOMINAL PAIN: 0
FATIGUE: 0
LIGHT-HEADEDNESS: 0
MYALGIAS: 0
FEVER: 0
SEIZURES: 0
SHORTNESS OF BREATH: 0
DIZZINESS: 0
COUGH: 0
ARTHRALGIAS: 0
NAUSEA: 0
CONSTIPATION: 0
WEAKNESS: 0
HEADACHES: 1
TREMORS: 1
NUMBNESS: 0
VOMITING: 0
CHILLS: 0
DIARRHEA: 0

## 2024-07-09 NOTE — PROGRESS NOTES
Subjective   Patient ID: Arcenio Rodriguez is a 34 y.o. male who presents for Follow-up (Full body shakes last week).    HPI     He has a history of ocular migraines. He had a workup with an MRI in 2022.  He has an upcoming appointment with his eye doctor.  He has been having recurrent chancre sores.    [place]  [place] from the day. He had pizza and some beer but only about 1-2 drinks.  As he was going to the bathroom and started shaking. This was in his hands as well.  He thinks he was decently hydrated.  He had a lot of blankets and it calmed down after about an hour.  He got a lot of sun since [place] but he wasn't really terribly burned.  Never had this in the past. No symptoms like this since that time.  No issues on the flight home.  Some headaches but no other symptoms.    Review of Systems   Constitutional:  Negative for chills, fatigue and fever.   HENT:  Negative for congestion and postnasal drip.    Respiratory:  Negative for cough and shortness of breath.    Cardiovascular:  Negative for chest pain.   Gastrointestinal:  Negative for abdominal pain, constipation, diarrhea, nausea and vomiting.   Musculoskeletal:  Negative for arthralgias and myalgias.   Neurological:  Positive for tremors and headaches. Negative for dizziness, seizures, weakness, light-headedness and numbness.       Objective   /76   Pulse 57   Temp 36.4 °C (97.5 °F)   Wt 85.3 kg (188 lb)   SpO2 98%   BMI 23.50 kg/m²     Physical Exam  Vitals and nursing note reviewed.   Constitutional:       General: He is not in acute distress.     Appearance: Normal appearance. He is normal weight. He is not ill-appearing or toxic-appearing.   HENT:      Head: Normocephalic and atraumatic.      Right Ear: Tympanic membrane, ear canal and external ear normal.      Left Ear: Tympanic membrane, ear canal and external ear normal.   Cardiovascular:      Rate and Rhythm:  Normal rate and regular rhythm.      Heart sounds: Normal heart sounds.   Pulmonary:      Effort: Pulmonary effort is normal.      Breath sounds: Normal breath sounds.   Neurological:      General: No focal deficit present.      Mental Status: He is alert and oriented to person, place, and time. Mental status is at baseline.      Cranial Nerves: No cranial nerve deficit.      Motor: No weakness.      Gait: Gait normal.         Assessment/Plan   Problem List Items Addressed This Visit             ICD-10-CM    Ocular migraine G43.109    Other irritable bowel syndrome K58.8     Other Visit Diagnoses         Codes    Episode of shaking    -  Primary R25.1    Relevant Orders    Thyroid Stimulating Hormone    Thyroxine, Free    Vitamin B12    Folate    Hemoglobin A1C    Magnesium    Healthcare maintenance     Z00.00    Relevant Orders    Lipid Panel    Comprehensive Metabolic Panel    CBC and Auto Differential    Hemoglobin A1C    Vitamin D deficiency     E55.9    Relevant Orders    Vitamin D 25-Hydroxy,Total (for eval of Vitamin D levels)          History and physical examination as above.  Lab work orders placed for the patient and he can go downstairs and have these done since he is fasting.  We will contact him back with the results especially if they are more abnormal.  Will plan on doing a more in-depth follow-up appointment for his wellness examination.  He has not had any other symptoms since the initial episodes.  Instructed to call if he starts experiencing anything else.  Will add on vitamin B testing as well as well as making sure blood sugars are overall well-controlled.  Patient will follow-up sooner for other acute concerns or complaints.

## 2024-07-09 NOTE — PATIENT INSTRUCTIONS
Lab work orders placed for you.  Since you are fasting today, you can go downstairs and get these done.  Will have the results before the end of the week.  I will try to have my nurse give you call back with those results.    Please call me if you end up having any other symptoms or any other issues in that timeframe.    Please get set up for a wellness exam with me before you leave the office today.  I think I am scheduling into August.    Thank you

## 2024-07-11 DIAGNOSIS — R03.0 ELEVATED BLOOD PRESSURE READING: Primary | ICD-10-CM

## 2024-07-18 ENCOUNTER — LAB (OUTPATIENT)
Dept: LAB | Facility: LAB | Age: 34
End: 2024-07-18
Payer: COMMERCIAL

## 2024-07-18 DIAGNOSIS — R03.0 ELEVATED BLOOD PRESSURE READING: ICD-10-CM

## 2024-07-18 PROCEDURE — 82043 UR ALBUMIN QUANTITATIVE: CPT

## 2024-07-18 PROCEDURE — 81003 URINALYSIS AUTO W/O SCOPE: CPT

## 2024-07-18 PROCEDURE — 82570 ASSAY OF URINE CREATININE: CPT

## 2024-07-19 LAB
APPEARANCE UR: CLEAR
BILIRUB UR STRIP.AUTO-MCNC: NEGATIVE MG/DL
COLOR UR: NORMAL
CREAT UR-MCNC: 38 MG/DL (ref 20–370)
GLUCOSE UR STRIP.AUTO-MCNC: NORMAL MG/DL
KETONES UR STRIP.AUTO-MCNC: NEGATIVE MG/DL
LEUKOCYTE ESTERASE UR QL STRIP.AUTO: NEGATIVE
MICROALBUMIN UR-MCNC: <7 MG/L
MICROALBUMIN/CREAT UR: NORMAL MG/G{CREAT}
NITRITE UR QL STRIP.AUTO: NEGATIVE
PH UR STRIP.AUTO: 7 [PH]
PROT UR STRIP.AUTO-MCNC: NEGATIVE MG/DL
RBC # UR STRIP.AUTO: NEGATIVE /UL
SP GR UR STRIP.AUTO: 1.01
UROBILINOGEN UR STRIP.AUTO-MCNC: NORMAL MG/DL

## 2024-09-10 ENCOUNTER — APPOINTMENT (OUTPATIENT)
Dept: PRIMARY CARE | Facility: CLINIC | Age: 34
End: 2024-09-10
Payer: COMMERCIAL

## 2024-09-17 ENCOUNTER — APPOINTMENT (OUTPATIENT)
Dept: PRIMARY CARE | Facility: CLINIC | Age: 34
End: 2024-09-17
Payer: COMMERCIAL

## 2024-10-24 ENCOUNTER — APPOINTMENT (OUTPATIENT)
Dept: PRIMARY CARE | Facility: CLINIC | Age: 34
End: 2024-10-24
Payer: COMMERCIAL

## 2024-10-24 VITALS
DIASTOLIC BLOOD PRESSURE: 82 MMHG | HEART RATE: 57 BPM | HEIGHT: 75 IN | BODY MASS INDEX: 23.57 KG/M2 | WEIGHT: 189.6 LBS | SYSTOLIC BLOOD PRESSURE: 121 MMHG | OXYGEN SATURATION: 98 %

## 2024-10-24 DIAGNOSIS — Z00.00 ENCOUNTER FOR ADULT WELLNESS VISIT: Primary | ICD-10-CM

## 2024-10-24 DIAGNOSIS — K58.8 OTHER IRRITABLE BOWEL SYNDROME: ICD-10-CM

## 2024-10-24 PROBLEM — S05.01XA RIGHT CORNEAL ABRASION: Status: RESOLVED | Noted: 2023-05-19 | Resolved: 2024-10-24

## 2024-10-24 PROBLEM — K64.9 ACUTE HEMORRHOID: Status: RESOLVED | Noted: 2024-03-21 | Resolved: 2024-10-24

## 2024-10-24 PROBLEM — J02.8 ACUTE PHARYNGITIS DUE TO OTHER SPECIFIED ORGANISMS: Status: RESOLVED | Noted: 2023-05-19 | Resolved: 2024-10-24

## 2024-10-24 RX ORDER — AMOXICILLIN AND CLAVULANATE POTASSIUM 875; 125 MG/1; MG/1
1 TABLET, FILM COATED ORAL
COMMUNITY
Start: 2024-10-21

## 2024-10-24 ASSESSMENT — ENCOUNTER SYMPTOMS
CHILLS: 0
NUMBNESS: 0
DYSPHORIC MOOD: 0
ABDOMINAL PAIN: 0
FATIGUE: 0
SORE THROAT: 0
LIGHT-HEADEDNESS: 0
NERVOUS/ANXIOUS: 0
DYSURIA: 0
COLOR CHANGE: 0
ARTHRALGIAS: 0
FEVER: 0
SHORTNESS OF BREATH: 0
DIZZINESS: 0
NAUSEA: 0
RHINORRHEA: 0
FREQUENCY: 0
PALPITATIONS: 0
COUGH: 0
CONSTIPATION: 0
MYALGIAS: 0
DIARRHEA: 0
VOMITING: 0

## 2024-10-24 ASSESSMENT — PATIENT HEALTH QUESTIONNAIRE - PHQ9
2. FEELING DOWN, DEPRESSED OR HOPELESS: NOT AT ALL
SUM OF ALL RESPONSES TO PHQ9 QUESTIONS 1 AND 2: 0
1. LITTLE INTEREST OR PLEASURE IN DOING THINGS: NOT AT ALL

## 2024-10-24 NOTE — PATIENT INSTRUCTIONS
Debrox ear drops.    Thank you for coming in for your wellness examination.    We did perform an EKG in the office today which overall did not have any abnormal findings and did look pretty consistent with your previous EKG from 2018.    I went ahead and placed a referral for gastroenterology.  Will fax this over to their office.  Please call in the next week or so if you do not hear from their office about getting set up for an appointment.    Please keep up the good work with overall healthy diet and exercise.  Excellent work with reducing alcohol intake.    Once you are feeling better and off the antibiotic, I would recommend getting an annual flu vaccine as well as the updated COVID immunization.    We can continue with regular follow-up.    Please come in sooner with any other acute concerns or complaints.

## 2024-10-24 NOTE — PROGRESS NOTES
"Subjective   Patient ID: Arcenio Rodriguez is a 34 y.o. male who presents for Annual Exam (Patient is here for annual exam and hypertension follow up. ).    HPI     No acute concerns or complaints today.    Dental: Every 6 months  Vision: Has contacts. Yearly eye exams.    Diet: Well balanced diet  Exercise: Running, cycling  Caffeine: Coffee in the morning.  Fluids: Well hydrated.  Supplements: Multivitamin, apple cider vinegar    Tobacco: None. Never  Alcohol: Maybe a glass of wine a week.  Recreational Drugs: None    Last Colonoscopy: On father's side, his grandmother and grandfather colon cancer. Grandfather diagnosed in his 50s. Father has been negative with screenings.  Patient had last colonoscopy in 2015 with Dr. Joyner in [place]. Was done for irritable bowel.  Low Dose Lung CT Screening: Not indicated.  AAA Screening: Not indicated.    Influenza: Recommended annually.  Tetanus: 2015  COVID: Recommended updated vaccine.    Review of Systems   Constitutional:  Negative for chills, fatigue and fever.   HENT:  Negative for congestion, rhinorrhea and sore throat.    Eyes:  Negative for visual disturbance.   Respiratory:  Negative for cough and shortness of breath.    Cardiovascular:  Negative for chest pain and palpitations.   Gastrointestinal:  Negative for abdominal pain, constipation, diarrhea, nausea and vomiting.   Genitourinary:  Negative for dysuria and frequency.   Musculoskeletal:  Negative for arthralgias and myalgias.   Skin:  Negative for color change and rash.   Neurological:  Negative for dizziness, light-headedness and numbness.   Psychiatric/Behavioral:  Negative for dysphoric mood. The patient is not nervous/anxious.        Objective   /82   Pulse 57   Ht 1.905 m (6' 3\")   Wt 86 kg (189 lb 9.6 oz)   SpO2 98%   BMI 23.70 kg/m²   BSA Body surface area is 2.13 meters squared.      Physical Exam  Vitals and nursing note reviewed.   Constitutional:       General: He is not in acute distress.   "   Appearance: Normal appearance. He is normal weight. He is not ill-appearing or toxic-appearing.   HENT:      Head: Normocephalic and atraumatic.      Right Ear: Tympanic membrane, ear canal and external ear normal.      Left Ear: Tympanic membrane, ear canal and external ear normal.      Nose: Nose normal.      Mouth/Throat:      Mouth: Mucous membranes are moist.      Pharynx: Oropharynx is clear.   Eyes:      Extraocular Movements: Extraocular movements intact.      Conjunctiva/sclera: Conjunctivae normal.      Pupils: Pupils are equal, round, and reactive to light.   Cardiovascular:      Rate and Rhythm: Normal rate and regular rhythm.      Pulses: Normal pulses.      Heart sounds: Normal heart sounds.   Pulmonary:      Effort: Pulmonary effort is normal.      Breath sounds: Normal breath sounds.   Abdominal:      General: Abdomen is flat. Bowel sounds are normal.      Palpations: Abdomen is soft.   Musculoskeletal:         General: Normal range of motion.      Cervical back: Normal range of motion and neck supple.   Skin:     General: Skin is warm and dry.   Neurological:      General: No focal deficit present.      Mental Status: He is alert and oriented to person, place, and time. Mental status is at baseline.      Cranial Nerves: No cranial nerve deficit.      Sensory: No sensory deficit.      Motor: No weakness.   Psychiatric:         Mood and Affect: Mood normal.         Behavior: Behavior normal.         Thought Content: Thought content normal.         Judgment: Judgment normal.       Lab on 07/18/2024   Component Date Value Ref Range Status    Color, Urine 07/18/2024 Light-Yellow  Light-Yellow, Yellow, Dark-Yellow Final    Appearance, Urine 07/18/2024 Clear  Clear Final    Specific Gravity, Urine 07/18/2024 1.008  1.005 - 1.035 Final    pH, Urine 07/18/2024 7.0  5.0, 5.5, 6.0, 6.5, 7.0, 7.5, 8.0 Final    Protein, Urine 07/18/2024 NEGATIVE  NEGATIVE, 10 (TRACE), 20 (TRACE) mg/dL Final    Glucose, Urine  07/18/2024 Normal  Normal mg/dL Final    Blood, Urine 07/18/2024 NEGATIVE  NEGATIVE Final    Ketones, Urine 07/18/2024 NEGATIVE  NEGATIVE mg/dL Final    Bilirubin, Urine 07/18/2024 NEGATIVE  NEGATIVE Final    Urobilinogen, Urine 07/18/2024 Normal  Normal mg/dL Final    Nitrite, Urine 07/18/2024 NEGATIVE  NEGATIVE Final    Leukocyte Esterase, Urine 07/18/2024 NEGATIVE  NEGATIVE Final    Albumin, Urine Random 07/18/2024 <7.0  Not established mg/L Final    Creatinine, Urine Random 07/18/2024 38.0  20.0 - 370.0 mg/dL Final    Albumin/Creatinine Ratio 07/18/2024    Final    One or more analytes used in this calculation is outside of the analytical measurement range. Calculation cannot be performed.   Lab on 07/09/2024   Component Date Value Ref Range Status    Cholesterol 07/09/2024 149  0 - 199 mg/dL Final          Age      Desirable   Borderline High   High     0-19 Y     0 - 169       170 - 199     >/= 200    20-24 Y     0 - 189       190 - 224     >/= 225         >24 Y     0 - 199       200 - 239     >/= 240   **All ranges are based on fasting samples. Specific   therapeutic targets will vary based on patient-specific   cardiac risk.    Pediatric guidelines reference:Pediatrics 2011, 128(S5).Adult guidelines reference: NCEP ATPIII Guidelines,JULIA 2001, 258:2486-97    Venipuncture immediately after or during the administration of Metamizole may lead to falsely low results. Testing should be performed immediately prior to Metamizole dosing.    HDL-Cholesterol 07/09/2024 63.2  mg/dL Final      Age       Very Low   Low     Normal    High    0-19 Y    < 35      < 40     40-45     ----  20-24 Y    ----     < 40      >45      ----        >24 Y      ----     < 40     40-60      >60      Cholesterol/HDL Ratio 07/09/2024 2.4   Final      Ref Values  Desirable  < 3.4  High Risk  > 5.0    LDL Calculated 07/09/2024 76  <=99 mg/dL Final                                Near   Borderline      AGE      Desirable  Optimal    High      High     Very High     0-19 Y     0 - 109     ---    110-129   >/= 130     ----    20-24 Y     0 - 119     ---    120-159   >/= 160     ----      >24 Y     0 -  99   100-129  130-159   160-189     >/=190      VLDL 07/09/2024 10  0 - 40 mg/dL Final    Triglycerides 07/09/2024 51  0 - 149 mg/dL Final       Age         Desirable   Borderline High   High     Very High   0 D-90 D    19 - 174         ----         ----        ----  91 D- 9 Y     0 -  74        75 -  99     >/= 100      ----    10-19 Y     0 -  89        90 - 129     >/= 130      ----    20-24 Y     0 - 114       115 - 149     >/= 150      ----         >24 Y     0 - 149       150 - 199    200- 499    >/= 500    Venipuncture immediately after or during the administration of Metamizole may lead to falsely low results. Testing should be performed immediately prior to Metamizole dosing.    Non HDL Cholesterol 07/09/2024 86  0 - 149 mg/dL Final          Age       Desirable   Borderline High   High     Very High     0-19 Y     0 - 119       120 - 144     >/= 145    >/= 160    20-24 Y     0 - 149       150 - 189     >/= 190      ----         >24 Y    30 mg/dL above LDL Cholesterol goal      Glucose 07/09/2024 81  74 - 99 mg/dL Final    Sodium 07/09/2024 140  136 - 145 mmol/L Final    Potassium 07/09/2024 4.3  3.5 - 5.3 mmol/L Final    Chloride 07/09/2024 103  98 - 107 mmol/L Final    Bicarbonate 07/09/2024 26  21 - 32 mmol/L Final    Anion Gap 07/09/2024 15  10 - 20 mmol/L Final    Urea Nitrogen 07/09/2024 25 (H)  6 - 23 mg/dL Final    Creatinine 07/09/2024 1.11  0.50 - 1.30 mg/dL Final    eGFR 07/09/2024 89  >60 mL/min/1.73m*2 Final    Calculations of estimated GFR are performed using the 2021 CKD-EPI Study Refit equation without the race variable for the IDMS-Traceable creatinine methods.  https://jasn.asnjournals.org/content/early/2021/09/22/ASN.4532654741    Calcium 07/09/2024 10.0  8.6 - 10.6 mg/dL Final    Albumin 07/09/2024 5.0  3.4 - 5.0 g/dL Final     Alkaline Phosphatase 07/09/2024 59  33 - 120 U/L Final    Total Protein 07/09/2024 7.0  6.4 - 8.2 g/dL Final    AST 07/09/2024 24  9 - 39 U/L Final    Bilirubin, Total 07/09/2024 1.0  0.0 - 1.2 mg/dL Final    ALT 07/09/2024 34  10 - 52 U/L Final    Patients treated with Sulfasalazine may generate falsely decreased results for ALT.    WBC 07/09/2024 5.8  4.4 - 11.3 x10*3/uL Final    nRBC 07/09/2024 0.0  0.0 - 0.0 /100 WBCs Final    RBC 07/09/2024 4.85  4.50 - 5.90 x10*6/uL Final    Hemoglobin 07/09/2024 14.1  13.5 - 17.5 g/dL Final    Hematocrit 07/09/2024 41.5  41.0 - 52.0 % Final    MCV 07/09/2024 86  80 - 100 fL Final    MCH 07/09/2024 29.1  26.0 - 34.0 pg Final    MCHC 07/09/2024 34.0  32.0 - 36.0 g/dL Final    RDW 07/09/2024 12.3  11.5 - 14.5 % Final    Platelets 07/09/2024 245  150 - 450 x10*3/uL Final    Neutrophils % 07/09/2024 50.2  40.0 - 80.0 % Final    Immature Granulocytes %, Automated 07/09/2024 0.2  0.0 - 0.9 % Final    Immature Granulocyte Count (IG) includes promyelocytes, myelocytes and metamyelocytes but does not include bands. Percent differential counts (%) should be interpreted in the context of the absolute cell counts (cells/UL).    Lymphocytes % 07/09/2024 37.2  13.0 - 44.0 % Final    Monocytes % 07/09/2024 8.3  2.0 - 10.0 % Final    Eosinophils % 07/09/2024 3.1  0.0 - 6.0 % Final    Basophils % 07/09/2024 1.0  0.0 - 2.0 % Final    Neutrophils Absolute 07/09/2024 2.90  1.20 - 7.70 x10*3/uL Final    Percent differential counts (%) should be interpreted in the context of the absolute cell counts (cells/uL).    Immature Granulocytes Absolute, Au* 07/09/2024 0.01  0.00 - 0.70 x10*3/uL Final    Lymphocytes Absolute 07/09/2024 2.15  1.20 - 4.80 x10*3/uL Final    Monocytes Absolute 07/09/2024 0.48  0.10 - 1.00 x10*3/uL Final    Eosinophils Absolute 07/09/2024 0.18  0.00 - 0.70 x10*3/uL Final    Basophils Absolute 07/09/2024 0.06  0.00 - 0.10 x10*3/uL Final    Thyroid Stimulating Hormone 07/09/2024  1.92  0.44 - 3.98 mIU/L Final    Thyroxine, Free 07/09/2024 1.24  0.78 - 1.48 ng/dL Final    Vitamin D, 25-Hydroxy, Total 07/09/2024 46  30 - 100 ng/mL Final    Vitamin B12 07/09/2024 766  211 - 911 pg/mL Final    Folate, Serum 07/09/2024 >24.0  >5.0 ng/mL Final    Hemoglobin A1C 07/09/2024 5.0  see below % Final    Estimated Average Glucose 07/09/2024 97  Not Established mg/dL Final    Magnesium 07/09/2024 2.21  1.60 - 2.40 mg/dL Final     Current Outpatient Medications on File Prior to Visit   Medication Sig Dispense Refill    amoxicillin-pot clavulanate (Augmentin) 875-125 mg tablet Take 1 tablet by mouth every 12 hours.      azelastine (Astelin) 137 mcg (0.1 %) nasal spray Administer 2 sprays into each nostril 2 times a day.      cholecalciferol (Vitamin D3) 5,000 Units tablet Take by mouth once daily.      dicyclomine (Bentyl) 10 mg capsule Take 1 capsule (10 mg) by mouth 3 times a day as needed (abdominal cramping). 90 capsule 0    hydrocortisone (Anusol-HC) 2.5 % rectal cream Insert into the rectum 4 times a day as needed for hemorrhoids (rectal discomfort). 30 g 5    multivitamin tablet Take 1 tablet by mouth once daily.      olopatadine HCl (PATADAY OPHT) Administer into affected eye(s).      triamcinolone (Nasacort) 55 mcg nasal inhaler Administer into affected nostril(s).       No current facility-administered medications on file prior to visit.     No images are attached to the encounter.        Assessment/Plan   Problem List Items Addressed This Visit             ICD-10-CM    Other irritable bowel syndrome K58.8     Chronic.  Stable. On dicyclomine as needed.         Relevant Orders    Referral to Gastroenterology     Other Visit Diagnoses         Codes    Encounter for adult wellness visit    -  Primary Z00.00    Relevant Orders    ECG 12 lead (Clinic Performed) (Completed)          History and physical examination as above.  Patient coming in for annual wellness examination.  No acute concerns or  complaints today.  He denies any more episodes of the shaking that he had back in July.  We reviewed results of labs which were overall normal and without abnormal findings.  Patient been keeping track of blood pressures at home and overall within the normal range with only a couple of elevations.  EKG performed in the office today was also without abnormal findings.  Discussed other aspects of health including healthy diet and exercise, regular dental vision care, caffeine use, hydration, supplementation, substance use, screening test and immunizations.  Recommendations as documented.  He will continue with regular wellness care.  He will come in sooner with other acute concerns or complaints.

## 2024-12-30 ENCOUNTER — LAB (OUTPATIENT)
Dept: LAB | Facility: LAB | Age: 34
End: 2024-12-30
Payer: COMMERCIAL

## 2024-12-30 DIAGNOSIS — R10.9 UNSPECIFIED ABDOMINAL PAIN: Primary | ICD-10-CM

## 2024-12-30 DIAGNOSIS — R14.0 ABDOMINAL DISTENSION (GASEOUS): ICD-10-CM

## 2025-04-17 ENCOUNTER — TELEPHONE (OUTPATIENT)
Dept: PRIMARY CARE | Facility: CLINIC | Age: 35
End: 2025-04-17

## 2025-04-17 ENCOUNTER — OFFICE VISIT (OUTPATIENT)
Dept: PRIMARY CARE | Facility: CLINIC | Age: 35
End: 2025-04-17
Payer: COMMERCIAL

## 2025-04-17 VITALS
DIASTOLIC BLOOD PRESSURE: 70 MMHG | WEIGHT: 186 LBS | BODY MASS INDEX: 23.13 KG/M2 | SYSTOLIC BLOOD PRESSURE: 128 MMHG | TEMPERATURE: 97.7 F | HEIGHT: 75 IN | HEART RATE: 49 BPM

## 2025-04-17 DIAGNOSIS — Z00.00 HEALTHCARE MAINTENANCE: ICD-10-CM

## 2025-04-17 DIAGNOSIS — H57.89 DISCHARGE OF RIGHT EYE: ICD-10-CM

## 2025-04-17 DIAGNOSIS — H11.31 SUBCONJUNCTIVAL HEMORRHAGE OF RIGHT EYE: Primary | ICD-10-CM

## 2025-04-17 DIAGNOSIS — E55.9 VITAMIN D DEFICIENCY: ICD-10-CM

## 2025-04-17 PROCEDURE — 1036F TOBACCO NON-USER: CPT | Performed by: STUDENT IN AN ORGANIZED HEALTH CARE EDUCATION/TRAINING PROGRAM

## 2025-04-17 PROCEDURE — 99213 OFFICE O/P EST LOW 20 MIN: CPT | Performed by: STUDENT IN AN ORGANIZED HEALTH CARE EDUCATION/TRAINING PROGRAM

## 2025-04-17 PROCEDURE — 3008F BODY MASS INDEX DOCD: CPT | Performed by: STUDENT IN AN ORGANIZED HEALTH CARE EDUCATION/TRAINING PROGRAM

## 2025-04-17 RX ORDER — POLYMYXIN B SULFATE AND TRIMETHOPRIM 1; 10000 MG/ML; [USP'U]/ML
1 SOLUTION OPHTHALMIC
Qty: 10 ML | Refills: 0 | Status: SHIPPED | OUTPATIENT
Start: 2025-04-17

## 2025-04-17 ASSESSMENT — ENCOUNTER SYMPTOMS
FATIGUE: 0
CHILLS: 0
EYE DISCHARGE: 1
EYE REDNESS: 1
RHINORRHEA: 0
FEVER: 0
PHOTOPHOBIA: 0
SHORTNESS OF BREATH: 0
ABDOMINAL PAIN: 0
COUGH: 0

## 2025-04-17 ASSESSMENT — VISUAL ACUITY: OU: 1

## 2025-04-17 NOTE — PROGRESS NOTES
"Subjective   Patient ID: Arcenio Rodriguez is a 35 y.o. male who presents for Right eye red with discomfort.  (X last Wednesday. ).    HPI     His right eye has been bothering him for the past week.  His wife noticed it at the time and he had no irritation at that point.  Since then the redness has shifted a bit.   It has still not gone away. Now it is feeling like it is more irritated.  He does not remember getting anything in his eye.  He has been working with sawing and taking down a tree in his yard and has been wearing protective eye covering.      Review of Systems   Constitutional:  Negative for chills, fatigue and fever.   HENT:  Negative for congestion and rhinorrhea.    Eyes:  Positive for discharge and redness. Negative for photophobia and visual disturbance.   Respiratory:  Negative for cough and shortness of breath.    Cardiovascular:  Negative for chest pain.   Gastrointestinal:  Negative for abdominal pain.       Objective   /70 (BP Location: Left arm, Patient Position: Sitting)   Pulse (!) 49   Temp 36.5 °C (97.7 °F)   Ht 1.905 m (6' 3\")   Wt 84.4 kg (186 lb)   BMI 23.25 kg/m²     Physical Exam  Vitals and nursing note reviewed.   Constitutional:       General: He is not in acute distress.     Appearance: Normal appearance. He is normal weight. He is not ill-appearing or toxic-appearing.   HENT:      Head: Normocephalic and atraumatic.   Eyes:      General: Lids are normal. Lids are everted, no foreign bodies appreciated. Vision grossly intact. Gaze aligned appropriately.         Right eye: No discharge.         Left eye: No discharge.      Extraocular Movements: Extraocular movements intact.      Conjunctiva/sclera:      Right eye: Right conjunctiva is not injected. Hemorrhage (minimal around lateral region) present. No exudate.     Left eye: Left conjunctiva is not injected. No exudate or hemorrhage.     Pupils: Pupils are equal, round, and reactive to light.   Cardiovascular:      Rate " and Rhythm: Normal rate and regular rhythm.      Heart sounds: Normal heart sounds.   Pulmonary:      Effort: Pulmonary effort is normal.      Breath sounds: Normal breath sounds.   Neurological:      Mental Status: He is alert.         Assessment/Plan   Problem List Items Addressed This Visit    None  Visit Diagnoses         Codes      Subconjunctival hemorrhage of right eye    -  Primary H11.31      Discharge of right eye     H57.89    Relevant Medications    polymyxin B sulf-trimethoprim (Polytrim) ophthalmic solution      Vitamin D deficiency     E55.9    Relevant Orders    Vitamin D 25-Hydroxy,Total (for eval of Vitamin D levels)      Healthcare maintenance     Z00.00    Relevant Orders    CBC and Auto Differential    Comprehensive Metabolic Panel    Lipid Panel    TSH with reflex to Free T4 if abnormal    Vitamin D 25-Hydroxy,Total (for eval of Vitamin D levels)    Urinalysis with Reflex Microscopic          History and physical examination as above.  Patient coming in for concern with his right eye.  Physical examination demonstrating small subconjunctival hemorrhage of the right eye but overall seems to be improving and transitioning.  He does have a very min amount of discharge.  Did go ahead and send in for Polytrim eyedrops in case anything worsens over the weekend.  Orders for blood work and urinalysis placed for the patient to be done prior to his next appointment.  He will get set up for a wellness examination in the summer.  He will call sooner with other acute concerns or complaints.

## 2025-04-29 ENCOUNTER — OFFICE VISIT (OUTPATIENT)
Dept: PRIMARY CARE | Facility: CLINIC | Age: 35
End: 2025-04-29
Payer: COMMERCIAL

## 2025-04-29 VITALS
BODY MASS INDEX: 23.32 KG/M2 | WEIGHT: 186.6 LBS | TEMPERATURE: 97.6 F | DIASTOLIC BLOOD PRESSURE: 74 MMHG | HEART RATE: 68 BPM | SYSTOLIC BLOOD PRESSURE: 122 MMHG

## 2025-04-29 DIAGNOSIS — L23.7 POISON IVY DERMATITIS: Primary | ICD-10-CM

## 2025-04-29 PROCEDURE — 1036F TOBACCO NON-USER: CPT | Performed by: STUDENT IN AN ORGANIZED HEALTH CARE EDUCATION/TRAINING PROGRAM

## 2025-04-29 PROCEDURE — 99213 OFFICE O/P EST LOW 20 MIN: CPT | Performed by: STUDENT IN AN ORGANIZED HEALTH CARE EDUCATION/TRAINING PROGRAM

## 2025-04-29 RX ORDER — PREDNISONE 20 MG/1
TABLET ORAL
Qty: 26 TABLET | Refills: 0 | Status: SHIPPED | OUTPATIENT
Start: 2025-04-29 | End: 2025-05-15

## 2025-04-29 RX ORDER — TRIAMCINOLONE ACETONIDE 1 MG/G
CREAM TOPICAL 2 TIMES DAILY PRN
Qty: 80 G | Refills: 0 | Status: SHIPPED | OUTPATIENT
Start: 2025-04-29

## 2025-04-29 RX ORDER — TRIAMCINOLONE ACETONIDE 1 MG/G
CREAM TOPICAL 2 TIMES DAILY
Qty: 80 G | Refills: 0 | Status: SHIPPED | OUTPATIENT
Start: 2025-04-29 | End: 2025-04-29 | Stop reason: ENTERED-IN-ERROR

## 2025-04-29 ASSESSMENT — ENCOUNTER SYMPTOMS
DIARRHEA: 0
FEVER: 0
NAIL CHANGES: 0
SHORTNESS OF BREATH: 0
VOMITING: 0
COUGH: 0
SORE THROAT: 0
EYE PAIN: 0
RHINORRHEA: 0
ANOREXIA: 0
FATIGUE: 0
COLOR CHANGE: 0

## 2025-04-29 NOTE — PROGRESS NOTES
Subjective   Patient ID: Arcenio Rodriguez is a 35 y.o. male who presents for Itchy red rash on extremitys (X 1 week, recently chopped up a tree that came down. ).    Rash  This is a recurrent problem. The current episode started in the past 7 days. The problem has been rapidly worsening since onset. The affected locations include the left arm, left wrist, left buttock, left leg, right arm, right wrist, right buttock and right leg. The rash is characterized by blistering, burning, redness, swelling, draining and itchiness. He was exposed to nothing, a new detergent/soap, plant contact and shellfish. Pertinent negatives include no anorexia, congestion, cough, diarrhea, eye pain, facial edema, fatigue, fever, joint pain, nail changes, rhinorrhea, shortness of breath, sore throat or vomiting.        Rash started about a little over a week ago on his right lower leg on the outside portion.  Over this past weekend he has the rash spreading to the other leg, behind his knees, both wrists and arms on on the inside left thigh.  He has been using an over the counter lotion for poison ivy which does help acutely.   He states that he had been cutting down a tree in different stages and this started after one of the sessions that he had been outside.  It had started on the outside of the right lower leg a little over a week ago but has spread.  He has been trying not to itch the area but is not sure if he has done anything without thinking.    Review of Systems   Constitutional:  Negative for fatigue and fever.   HENT:  Negative for congestion, rhinorrhea and sore throat.    Eyes:  Negative for pain.   Respiratory:  Negative for cough and shortness of breath.    Gastrointestinal:  Negative for anorexia, diarrhea and vomiting.   Musculoskeletal:  Negative for joint pain.   Skin:  Positive for rash (right lateral leg worst, spread to other leg and left thigh, both arms and left foot). Negative for color change and nail changes.        Objective   /74 (BP Location: Left arm, Patient Position: Sitting)   Pulse 68   Temp 36.4 °C (97.6 °F)   Wt 84.6 kg (186 lb 9.6 oz)   BMI 23.32 kg/m²     Physical Exam  Vitals and nursing note reviewed.   Constitutional:       General: He is not in acute distress.     Appearance: Normal appearance. He is normal weight. He is not ill-appearing or toxic-appearing.   HENT:      Head: Normocephalic and atraumatic.   Cardiovascular:      Rate and Rhythm: Normal rate and regular rhythm.   Skin:     General: Skin is warm and dry.      Findings: Rash (Erythematous rash with linear streaking over the right lateral lower leg consistent with poison ivy dermatitis.  Spots then present on the left leg, left thigh, left and right ankles, bilateral wrists and forearms as well) present.   Neurological:      Mental Status: He is alert.         Assessment/Plan   Problem List Items Addressed This Visit    None  Visit Diagnoses         Codes      Poison ivy dermatitis    -  Primary L23.7          History and physical examination as above.  Patient coming with symptoms consistent with poison ivy dermatitis.  This started on the right lateral leg after he had been outside cutting down a tree and stages.  It has spread to other areas as diagnosed.  Started on a prednisone taper.  Did discuss over-the-counter treatments that could help with some of his symptoms.  Also given a prescription for triamcinolone cream.  He will call if symptoms change or if they continue to worsen.

## 2025-10-27 ENCOUNTER — APPOINTMENT (OUTPATIENT)
Dept: PRIMARY CARE | Facility: CLINIC | Age: 35
End: 2025-10-27
Payer: COMMERCIAL